# Patient Record
Sex: MALE | Race: WHITE | Employment: FULL TIME | URBAN - METROPOLITAN AREA
[De-identification: names, ages, dates, MRNs, and addresses within clinical notes are randomized per-mention and may not be internally consistent; named-entity substitution may affect disease eponyms.]

---

## 2018-03-14 ENCOUNTER — OFFICE VISIT (OUTPATIENT)
Dept: FAMILY MEDICINE CLINIC | Age: 43
End: 2018-03-14
Payer: COMMERCIAL

## 2018-03-14 ENCOUNTER — HOSPITAL ENCOUNTER (OUTPATIENT)
Dept: GENERAL RADIOLOGY | Age: 43
Discharge: HOME | End: 2018-03-16
Payer: COMMERCIAL

## 2018-03-14 VITALS
TEMPERATURE: 98.8 F | DIASTOLIC BLOOD PRESSURE: 72 MMHG | SYSTOLIC BLOOD PRESSURE: 118 MMHG | OXYGEN SATURATION: 98 % | HEIGHT: 67 IN | RESPIRATION RATE: 18 BRPM | BODY MASS INDEX: 31.08 KG/M2 | WEIGHT: 198 LBS | HEART RATE: 84 BPM

## 2018-03-14 DIAGNOSIS — R06.02 SOB (SHORTNESS OF BREATH): ICD-10-CM

## 2018-03-14 DIAGNOSIS — R05.9 COUGH: Primary | ICD-10-CM

## 2018-03-14 DIAGNOSIS — R05.9 COUGH: ICD-10-CM

## 2018-03-14 PROCEDURE — 71046 X-RAY EXAM CHEST 2 VIEWS: CPT

## 2018-03-14 PROCEDURE — 99213 OFFICE O/P EST LOW 20 MIN: CPT | Performed by: NURSE PRACTITIONER

## 2018-03-14 RX ORDER — GUAIFENESIN AND CODEINE PHOSPHATE 100; 10 MG/5ML; MG/5ML
5 SOLUTION ORAL EVERY 4 HOURS PRN
Qty: 118 ML | Refills: 0 | Status: SHIPPED | OUTPATIENT
Start: 2018-03-14 | End: 2018-03-21

## 2018-03-14 RX ORDER — AMOXICILLIN AND CLAVULANATE POTASSIUM 562.5; 437.5; 62.5 MG/1; MG/1; MG/1
1 TABLET, FILM COATED, EXTENDED RELEASE ORAL 2 TIMES DAILY
Qty: 14 TABLET | Refills: 0 | Status: SHIPPED | OUTPATIENT
Start: 2018-03-14 | End: 2018-03-21

## 2018-03-14 RX ORDER — BENZONATATE 100 MG/1
100 CAPSULE ORAL 3 TIMES DAILY PRN
Qty: 30 CAPSULE | Refills: 0 | Status: SHIPPED | OUTPATIENT
Start: 2018-03-14 | End: 2021-02-19

## 2018-03-14 ASSESSMENT — PATIENT HEALTH QUESTIONNAIRE - PHQ9
SUM OF ALL RESPONSES TO PHQ9 QUESTIONS 1 & 2: 0
2. FEELING DOWN, DEPRESSED OR HOPELESS: 0
SUM OF ALL RESPONSES TO PHQ QUESTIONS 1-9: 0
1. LITTLE INTEREST OR PLEASURE IN DOING THINGS: 0

## 2018-03-14 ASSESSMENT — ENCOUNTER SYMPTOMS
COUGH: 1
RHINORRHEA: 1
SHORTNESS OF BREATH: 1

## 2019-02-21 ENCOUNTER — TELEPHONE (OUTPATIENT)
Dept: FAMILY MEDICINE CLINIC | Age: 44
End: 2019-02-21

## 2021-06-14 ENCOUNTER — OFFICE VISIT (OUTPATIENT)
Dept: SPORTS MEDICINE | Age: 46
End: 2021-06-14
Payer: COMMERCIAL

## 2021-06-14 VITALS
TEMPERATURE: 98.7 F | DIASTOLIC BLOOD PRESSURE: 72 MMHG | HEIGHT: 67 IN | BODY MASS INDEX: 31.39 KG/M2 | WEIGHT: 200 LBS | SYSTOLIC BLOOD PRESSURE: 118 MMHG

## 2021-06-14 DIAGNOSIS — M19.022 PRIMARY OSTEOARTHRITIS OF LEFT ELBOW: Primary | ICD-10-CM

## 2021-06-14 PROCEDURE — 99213 OFFICE O/P EST LOW 20 MIN: CPT | Performed by: FAMILY MEDICINE

## 2021-06-14 ASSESSMENT — ENCOUNTER SYMPTOMS
EYE DISCHARGE: 0
SINUS PAIN: 0
CHEST TIGHTNESS: 0
APNEA: 0
ABDOMINAL DISTENTION: 0
FACIAL SWELLING: 0

## 2021-06-14 NOTE — PROGRESS NOTES
[facility]) Physicians  Neurosurgery and Pain [address] [facility]ish Boc[facility]: [phone]  F: [phone]      Taty Munoz  ([id number])    6/14/2021    Subjective: Taty Munoz is 55 y.o. male who complains today of:    Chief Complaint   Patient presents with    Follow-up     FOLLOW UP VISIT TO RECHECK LEFT ELBOW / 8 WEEKS POST PRP    Elbow Pain     PRIMARY OA OF THE LEFT ELBOW       HPI     Patient comes in stating that his elbow is feeling a lot better he says he gets a little tweak when he is doing push-ups other than that he is doing well  Allergies:  Erythromycin    Past Medical History:   Diagnosis Date    History of mononucleosis     History of pneumonia     History of prostatitis      No past surgical history on file. Family History   Problem Relation Age of Onset    Cancer Other         PROSTATE    Heart Attack Maternal Grandfather 64    Heart Attack Paternal Uncle 48     Social History     Socioeconomic History    Marital status:      Spouse name: Not on file    Number of children: Not on file    Years of education: Not on file    Highest education level: Not on file   Occupational History    Not on file   Tobacco Use    Smoking status: Former Smoker    Smokeless tobacco: Never Used   Substance and Sexual Activity    Alcohol use: Yes     Comment: occ    Drug use: No    Sexual activity: Yes     Partners: Female   Other Topics Concern    Not on file   Social History Narrative    Not on file     Social Determinants of Health     Financial Resource Strain:     Difficulty of Paying Living Expenses:    Food Insecurity:     Worried About Running Out of Food in the Last Year:     Ran Out of Food in the Last Year:    Transportation Needs:     Lack of Transportation (Medical):      Lack of Transportation (Non-Medical):    Physical Activity:     Days of Exercise per Week:     Minutes of Exercise per Session: point  Continue on his exercise program went over some exercise tips to take pressure off his elbow when he is doing his push-ups and also some other tips to help with weight training       No orders of the defined types were placed in this encounter. No orders of the defined types were placed in this encounter. Follow up:  Return if symptoms worsen or fail to improve.     MARYCARMEN PINTO, DO

## 2023-02-25 PROBLEM — F32.2 CURRENT SEVERE EPISODE OF MAJOR DEPRESSIVE DISORDER WITHOUT PSYCHOTIC FEATURES WITHOUT PRIOR EPISODE (MULTI): Status: ACTIVE | Noted: 2023-02-25

## 2023-02-25 PROBLEM — F34.1 PRIMARY DYSTHYMIA: Status: ACTIVE | Noted: 2023-02-25

## 2023-02-25 PROBLEM — F32.A DEPRESSION: Status: ACTIVE | Noted: 2023-02-25

## 2023-02-25 PROBLEM — M76.52 PATELLAR TENDINITIS OF BOTH KNEES: Status: ACTIVE | Noted: 2023-02-25

## 2023-02-25 PROBLEM — E66.811 CLASS 1 OBESITY WITH BODY MASS INDEX (BMI) OF 31.0 TO 31.9 IN ADULT: Status: ACTIVE | Noted: 2023-02-25

## 2023-02-25 PROBLEM — R73.9 HYPERGLYCEMIA: Status: ACTIVE | Noted: 2023-02-25

## 2023-02-25 PROBLEM — E78.49 OTHER HYPERLIPIDEMIA: Status: ACTIVE | Noted: 2023-02-25

## 2023-02-25 PROBLEM — R03.0 PREHYPERTENSION: Status: ACTIVE | Noted: 2023-02-25

## 2023-02-25 PROBLEM — E66.9 CLASS 1 OBESITY WITH BODY MASS INDEX (BMI) OF 31.0 TO 31.9 IN ADULT: Status: ACTIVE | Noted: 2023-02-25

## 2023-02-25 PROBLEM — M76.51 PATELLAR TENDINITIS OF BOTH KNEES: Status: ACTIVE | Noted: 2023-02-25

## 2023-02-25 PROBLEM — K21.9 ACID REFLUX: Status: ACTIVE | Noted: 2023-02-25

## 2023-02-25 RX ORDER — OMEPRAZOLE 20 MG/1
1 TABLET, DELAYED RELEASE ORAL DAILY
COMMUNITY
Start: 2022-09-21

## 2023-02-25 RX ORDER — CHOLECALCIFEROL (VITAMIN D3) 125 MCG
1 TABLET ORAL DAILY
COMMUNITY
Start: 2022-09-21

## 2023-02-25 RX ORDER — CALCIUM CARBONATE 300MG(750)
1 TABLET,CHEWABLE ORAL DAILY
COMMUNITY
Start: 2022-09-21

## 2023-02-25 RX ORDER — SERTRALINE HYDROCHLORIDE 50 MG/1
1 TABLET, FILM COATED ORAL DAILY
COMMUNITY
Start: 2020-05-04 | End: 2023-10-12 | Stop reason: SDUPTHER

## 2023-02-25 RX ORDER — GLUCOSAMINE/D3/BOSWELLIA SERRA 1500MG-400
1 TABLET ORAL DAILY
COMMUNITY
Start: 2022-09-21

## 2023-03-03 LAB
ALANINE AMINOTRANSFERASE (SGPT) (U/L) IN SER/PLAS: 38 U/L (ref 10–52)
ALBUMIN (G/DL) IN SER/PLAS: 4.4 G/DL (ref 3.4–5)
ALKALINE PHOSPHATASE (U/L) IN SER/PLAS: 77 U/L (ref 33–120)
ANION GAP IN SER/PLAS: 11 MMOL/L (ref 10–20)
APPEARANCE, URINE: CLEAR
ASPARTATE AMINOTRANSFERASE (SGOT) (U/L) IN SER/PLAS: 35 U/L (ref 9–39)
BASOPHILS (10*3/UL) IN BLOOD BY AUTOMATED COUNT: 0.04 X10E9/L (ref 0–0.1)
BASOPHILS/100 LEUKOCYTES IN BLOOD BY AUTOMATED COUNT: 0.5 % (ref 0–2)
BILIRUBIN TOTAL (MG/DL) IN SER/PLAS: 0.6 MG/DL (ref 0–1.2)
BILIRUBIN, URINE: NEGATIVE
BLOOD, URINE: NEGATIVE
CALCIUM (MG/DL) IN SER/PLAS: 9.7 MG/DL (ref 8.6–10.3)
CARBON DIOXIDE, TOTAL (MMOL/L) IN SER/PLAS: 29 MMOL/L (ref 21–32)
CHLORIDE (MMOL/L) IN SER/PLAS: 101 MMOL/L (ref 98–107)
CHOLESTEROL (MG/DL) IN SER/PLAS: 238 MG/DL (ref 0–199)
CHOLESTEROL IN HDL (MG/DL) IN SER/PLAS: 57.8 MG/DL
CHOLESTEROL/HDL RATIO: 4.1
COLOR, URINE: YELLOW
CREATININE (MG/DL) IN SER/PLAS: 0.97 MG/DL (ref 0.5–1.3)
EOSINOPHILS (10*3/UL) IN BLOOD BY AUTOMATED COUNT: 0.34 X10E9/L (ref 0–0.7)
EOSINOPHILS/100 LEUKOCYTES IN BLOOD BY AUTOMATED COUNT: 4.5 % (ref 0–6)
ERYTHROCYTE DISTRIBUTION WIDTH (RATIO) BY AUTOMATED COUNT: 11.6 % (ref 11.5–14.5)
ERYTHROCYTE MEAN CORPUSCULAR HEMOGLOBIN CONCENTRATION (G/DL) BY AUTOMATED: 33.8 G/DL (ref 32–36)
ERYTHROCYTE MEAN CORPUSCULAR VOLUME (FL) BY AUTOMATED COUNT: 95 FL (ref 80–100)
ERYTHROCYTES (10*6/UL) IN BLOOD BY AUTOMATED COUNT: 5.01 X10E12/L (ref 4.5–5.9)
ESTIMATED AVERAGE GLUCOSE FOR HBA1C: 103 MG/DL
GFR MALE: >90 ML/MIN/1.73M2
GLUCOSE (MG/DL) IN SER/PLAS: 104 MG/DL (ref 74–99)
GLUCOSE, URINE: NEGATIVE MG/DL
HEMATOCRIT (%) IN BLOOD BY AUTOMATED COUNT: 47.4 % (ref 41–52)
HEMOGLOBIN (G/DL) IN BLOOD: 16 G/DL (ref 13.5–17.5)
HEMOGLOBIN A1C/HEMOGLOBIN TOTAL IN BLOOD: 5.2 %
IMMATURE GRANULOCYTES/100 LEUKOCYTES IN BLOOD BY AUTOMATED COUNT: 0.4 % (ref 0–0.9)
KETONES, URINE: NEGATIVE MG/DL
LDL: 162 MG/DL (ref 0–99)
LEUKOCYTE ESTERASE, URINE: NEGATIVE
LEUKOCYTES (10*3/UL) IN BLOOD BY AUTOMATED COUNT: 7.6 X10E9/L (ref 4.4–11.3)
LYMPHOCYTES (10*3/UL) IN BLOOD BY AUTOMATED COUNT: 1.58 X10E9/L (ref 1.2–4.8)
LYMPHOCYTES/100 LEUKOCYTES IN BLOOD BY AUTOMATED COUNT: 20.9 % (ref 13–44)
MONOCYTES (10*3/UL) IN BLOOD BY AUTOMATED COUNT: 0.63 X10E9/L (ref 0.1–1)
MONOCYTES/100 LEUKOCYTES IN BLOOD BY AUTOMATED COUNT: 8.3 % (ref 2–10)
NEUTROPHILS (10*3/UL) IN BLOOD BY AUTOMATED COUNT: 4.94 X10E9/L (ref 1.2–7.7)
NEUTROPHILS/100 LEUKOCYTES IN BLOOD BY AUTOMATED COUNT: 65.4 % (ref 40–80)
NITRITE, URINE: NEGATIVE
PH, URINE: 7 (ref 5–8)
PLATELETS (10*3/UL) IN BLOOD AUTOMATED COUNT: 206 X10E9/L (ref 150–450)
POTASSIUM (MMOL/L) IN SER/PLAS: 4.2 MMOL/L (ref 3.5–5.3)
PROTEIN TOTAL: 6.9 G/DL (ref 6.4–8.2)
PROTEIN, URINE: NEGATIVE MG/DL
SODIUM (MMOL/L) IN SER/PLAS: 137 MMOL/L (ref 136–145)
SPECIFIC GRAVITY, URINE: 1.02 (ref 1–1.03)
THYROTROPIN (MIU/L) IN SER/PLAS BY DETECTION LIMIT <= 0.05 MIU/L: 1.64 MIU/L (ref 0.44–3.98)
TRIGLYCERIDE (MG/DL) IN SER/PLAS: 92 MG/DL (ref 0–149)
UREA NITROGEN (MG/DL) IN SER/PLAS: 25 MG/DL (ref 6–23)
UROBILINOGEN, URINE: <2 MG/DL (ref 0–1.9)
VLDL: 18 MG/DL (ref 0–40)

## 2023-03-10 ENCOUNTER — OFFICE VISIT (OUTPATIENT)
Dept: PRIMARY CARE | Facility: CLINIC | Age: 48
End: 2023-03-10
Payer: COMMERCIAL

## 2023-03-10 VITALS
HEART RATE: 85 BPM | WEIGHT: 205.2 LBS | SYSTOLIC BLOOD PRESSURE: 137 MMHG | OXYGEN SATURATION: 96 % | BODY MASS INDEX: 32.21 KG/M2 | TEMPERATURE: 97.8 F | DIASTOLIC BLOOD PRESSURE: 75 MMHG | RESPIRATION RATE: 16 BRPM | HEIGHT: 67 IN

## 2023-03-10 DIAGNOSIS — F34.1 PRIMARY DYSTHYMIA: ICD-10-CM

## 2023-03-10 DIAGNOSIS — E66.09 CLASS 1 OBESITY DUE TO EXCESS CALORIES WITHOUT SERIOUS COMORBIDITY WITH BODY MASS INDEX (BMI) OF 32.0 TO 32.9 IN ADULT: ICD-10-CM

## 2023-03-10 DIAGNOSIS — I10 BENIGN ESSENTIAL HTN: Primary | ICD-10-CM

## 2023-03-10 DIAGNOSIS — E78.2 MIXED HYPERLIPIDEMIA: ICD-10-CM

## 2023-03-10 DIAGNOSIS — F32.2 CURRENT SEVERE EPISODE OF MAJOR DEPRESSIVE DISORDER WITHOUT PSYCHOTIC FEATURES WITHOUT PRIOR EPISODE (MULTI): ICD-10-CM

## 2023-03-10 DIAGNOSIS — R73.9 HYPERGLYCEMIA: ICD-10-CM

## 2023-03-10 PROBLEM — F32.A DEPRESSION: Status: RESOLVED | Noted: 2023-02-25 | Resolved: 2023-03-10

## 2023-03-10 PROBLEM — R03.0 PREHYPERTENSION: Status: RESOLVED | Noted: 2023-02-25 | Resolved: 2023-03-10

## 2023-03-10 PROCEDURE — 99214 OFFICE O/P EST MOD 30 MIN: CPT | Performed by: FAMILY MEDICINE

## 2023-03-10 PROCEDURE — 3075F SYST BP GE 130 - 139MM HG: CPT | Performed by: FAMILY MEDICINE

## 2023-03-10 PROCEDURE — 3078F DIAST BP <80 MM HG: CPT | Performed by: FAMILY MEDICINE

## 2023-03-10 PROCEDURE — 3008F BODY MASS INDEX DOCD: CPT | Performed by: FAMILY MEDICINE

## 2023-03-10 RX ORDER — LISINOPRIL 10 MG/1
10 TABLET ORAL DAILY
Qty: 30 TABLET | Refills: 5 | Status: SHIPPED | OUTPATIENT
Start: 2023-03-10 | End: 2024-01-11

## 2023-03-10 ASSESSMENT — PATIENT HEALTH QUESTIONNAIRE - PHQ9
2. FEELING DOWN, DEPRESSED OR HOPELESS: NOT AT ALL
1. LITTLE INTEREST OR PLEASURE IN DOING THINGS: NOT AT ALL
SUM OF ALL RESPONSES TO PHQ9 QUESTIONS 1 & 2: 0

## 2023-03-10 NOTE — PATIENT INSTRUCTIONS
Patient to continue current medications (with any exceptions as noted) and diet.      Patient was started on:   Lisinopril 10 mg, TAKE 1 TAB BY MOUTH DAILY    Rx(s) sent to pharmacy.     Discussed medication side effects with patient.     Patient plans to eat less eggs or eat more egg whites on days when he lifts at the gym.

## 2023-03-10 NOTE — PROGRESS NOTES
"Subjective   Patient ID: Francisco Avila is a 47 y.o. male who presents for pre hypertension, depression, hyperglycemia, hyperlipidemia and other chronic conditions.     HPI   Patient has no medical concerns today and no refill requests.     He notes that he is currently eating 5 eggs per day, on days he lifts weights at the gym. He works out daily.     He is feeling well from a mood standpoint.     Review of Systems  Except positives as noted in the CC & HPI      Constitutional: Denies fevers, chills, night sweats, fatigue, weight changes, change in appetite    Eyes: Denies blurry vision, double vision    ENT: Denies otalgia, trouble hearing, tinnitus, vertigo, nasal congestion, rhinorrhea, sore throat    Neck: Denies swelling, masses    Cardiovascular: Denies chest pain, palpitations, edema, orthopnea, syncope    Respiratory: Denies dyspnea, cough, wheezing, postural nocturnal dyspnea    Gastrointestinal: Denies abdominal pain, nausea, vomiting, diarrhea, constipation, melena, hematochezia    Genitourinary: Denies dysuria, hematuria, frequency, urgency    Musculoskeletal: Denies back pain, neck pain, arthralgias, myalgias    Integumentary: Denies skin lesions, rashes, masses    Neurological: Denies dizziness, headaches, confusion, limb weakness, paresthesias, syncope, convulsions    Psychiatric: Denies depression, anxiety, homicidal ideations, suicidal ideations, sleep disturbances    Endocrine: Denies polyphagia, polydipsia, polyuria, weakness, hair thinning, heat intolerance, cold intolerance, weight changes    Heme/Lymph: Denies easy bruising, easy bleeding, swollen glands     Objective   /75   Pulse 85   Temp 36.6 °C (97.8 °F)   Resp 16   Ht 1.702 m (5' 7\")   Wt 93.1 kg (205 lb 3.2 oz)   SpO2 96%   BMI 32.14 kg/m²     Physical Exam  142/76 on recheck of BP in the right arm.     General Appearance - well-developed, well-nourished, 47 y.o., White male in no acute distress.     Skin - warm, pink and dry " without rash or concerning lesions.     Mental Status - alert and oriented x 3. Normal mood and affect appropriate to mood.     Neck - supple without lymphadenopathy. Carotid pulses are normal without bruits. Thyroid is normal in midline without nodules.     Chest - lungs are clear to auscultation without rales, rhonchi or wheezes.     Heart - regular, rate and rhythm without murmurs, rubs or gallops.    Abdomen - soft, obese, protuberant, nontender, nondistended. No masses, hepatomegaly or splenomegaly is noted. No rebound, rigidity or guarding is noted. Bowel sounds are normoactive.    Extremities - no cyanosis, clubbing or edema. Pedal pulses are 2+ normal at the dorsalis pedis and posterior pulses bilaterally.     Neurological - cranial nerves II through XII are grossly intact. Motor strength 5/5 at all fours.     Lab Results   Component Value Date    HGBA1C 5.2 03/03/2023      Lab Results   Component Value Date    TSH 1.64 03/03/2023        Chemistry    Lab Results   Component Value Date/Time     03/03/2023 0913    K 4.2 03/03/2023 0913     03/03/2023 0913    CO2 29 03/03/2023 0913    BUN 25 (H) 03/03/2023 0913    CREATININE 0.97 03/03/2023 0913    Lab Results   Component Value Date/Time    CALCIUM 9.7 03/03/2023 0913    ALKPHOS 77 03/03/2023 0913    AST 35 03/03/2023 0913    ALT 38 03/03/2023 0913    BILITOT 0.6 03/03/2023 0913         Lab Results   Component Value Date    CHOL 238 (H) 03/03/2023    CHOL 212 (H) 05/12/2021     Lab Results   Component Value Date    HDL 57.8 03/03/2023    HDL 51.0 05/12/2021     Lab Results   Component Value Date    TRIG 92 03/03/2023    TRIG 155 (H) 05/12/2021     Assessment/Plan   Patient to continue current medications (with any exceptions as noted) and diet. Follow-up in 1-2 month(s) otherwise as needed.     Patient was started on:   Lisinopril 10 mg, TAKE 1 TAB BY MOUTH DAILY    Rx(s) sent to pharmacy.     Discussed medication side effects with patient.      Patient plans to eat less eggs or eat more egg whites on days when he lifts at the gym.     Scribe Attestation  By signing my name below, I, Heber Hong   attest that this documentation has been prepared under the direction and in the presence of Tigre Dunlap MD.

## 2023-05-11 NOTE — PROGRESS NOTES
No chief complaint on file.      HPI: Francisco Avila is a 48 y.o. male presenting for follow-up of Acid Reflux, HLD, and other chronic conditions .  I last saw the patient on 3/10/23.         ROS    Except positives as noted in the CC & HPI   Constitutional: Denies fevers, chills, night sweats, fatigue, weight changes, change in appetite   Eyes: Denies blurry vision, double vision   ENT: Denies otalgia, trouble hearing, tinnitus, vertigo, nasal congestion, rhinorrhea, sore throat   Neck: Denies swelling, masses   Cardiovascular: Denies chest pain, palpitations, edema, orthopnea, syncope   Respiratory: Denies dyspnea, cough, wheezing, postural nocturnal dyspnea   Gastrointestinal: Denies abdominal pain, nausea, vomiting, diarrhea, constipation, melena, hematochezia   Genitourinary: Denies dysuria, hematuria, frequency, urgency   Musculoskeletal: Denies back pain, neck pain, arthralgias, myalgias   Integumentary: Denies skin lesions, rashes, masses   Neurological: Denies dizziness, headaches, confusion, limb weakness, paresthesias, syncope, convulsions   Psychiatric: Denies depression, anxiety, homicidal ideations, suicidal ideations, sleep disturbances   Endocrine: Denies polyphagia, polydipsia, polyuria, weakness, hair thinning, heat intolerance, cold intolerance, weight changes   Heme/Lymph: Denies easy bruising, easy bleeding, swollen glands     There were no vitals filed for this visit.    PHYSICAL EXAM    GENERAL APPEARANCE: well-developed, well-nourished, 48 y.o. male in no acute distress.  SKIN: warm, pink and dry without rash or concerning lesions.  MENTAL STATUS: alert and oriented × 3. Normal mood and affect appropriate to mood.  EARS: TMs shiny and move well with insufflation. Ear canals are clear bilaterally.  NECK: supple without lymphadenopathy. Carotid pulses are normal without bruits. Thyroid - is normal in midline without nodules.  CHEST: lungs are clear to auscultation without rales, rhonchi or  wheezes.  HEART: regular, rate and rhythm without murmurs, rubs or gallops.  ABDOMEN: soft, flat, nondistended. No masses, hepatomegaly or splenomegaly is noted.  EXTREMITIES: no cyanosis, clubbing or edema. Pedal pulses are 2+ normal at the dorsalis pedis and posterior tibial pulses bilaterally.  NEUROLOGICAL: cranial nerves II through XII are grossly intact. Motor strength 5/5 at all fours. DTRs are 2+ normal at all fours bilaterally and symmetrically.      Below is the patient's most recent value for Albumin, ALT, AST, BUN, Calcium, Chloride, Cholesterol, CO2, Creatinine, GFR, Glucose, HDL, Hematocrit, Hemoglobin, Hemoglobin A1C, LDL, Magnesium, Phosphorus, Platelets, Potassium, PSA, Sodium, Triglycerides, and WBC.   Lab Results   Component Value Date    ALBUMIN 4.4 03/03/2023    ALT 38 03/03/2023    AST 35 03/03/2023    BUN 25 (H) 03/03/2023    CALCIUM 9.7 03/03/2023     03/03/2023    CHOL 238 (H) 03/03/2023    CO2 29 03/03/2023    CREATININE 0.97 03/03/2023    HDL 57.8 03/03/2023    HCT 47.4 03/03/2023    HGB 16.0 03/03/2023    HGBA1C 5.2 03/03/2023     03/03/2023    K 4.2 03/03/2023     03/03/2023    TRIG 92 03/03/2023    WBC 7.6 03/03/2023         ASSESSMENT:  No diagnosis found.    PLAN:  No orders of the defined types were placed in this encounter.      [unfilled]    I have instructed the patient to follow-up with me in *** months or sooner if needed.

## 2023-05-12 ENCOUNTER — APPOINTMENT (OUTPATIENT)
Dept: PRIMARY CARE | Facility: CLINIC | Age: 48
End: 2023-05-12
Payer: COMMERCIAL

## 2023-05-19 NOTE — PROGRESS NOTES
"Subjective   Patient ID: Francisco Avila is a 48 y.o. male who presents for GERD, Hyperlipidemia, and Follow-up. I last saw the patient on 3/10/2023.     HPI   Patient has no medical complaints today.    Review of Systems  Except positives as noted in the CC & HPI      Constitutional: Denies fevers, chills, night sweats, fatigue, weight changes, change in appetite    Eyes: Denies blurry vision, double vision    ENT: Denies otalgia, trouble hearing, tinnitus, vertigo, nasal congestion, rhinorrhea, sore throat    Neck: Denies swelling, masses    Cardiovascular: Denies chest pain, palpitations, edema, orthopnea, syncope    Respiratory: Denies dyspnea, cough, wheezing, postural nocturnal dyspnea    Gastrointestinal: Denies abdominal pain, nausea, vomiting, diarrhea, constipation, melena, hematochezia    Genitourinary: Denies dysuria, hematuria, frequency, urgency    Musculoskeletal: Denies back pain, neck pain, arthralgias, myalgias    Integumentary: Denies skin lesions, rashes, masses    Neurological: Denies dizziness, headaches, confusion, limb weakness, paresthesias, syncope, convulsions    Psychiatric: Denies depression, anxiety, homicidal ideations, suicidal ideations, sleep disturbances    Endocrine: Denies polyphagia, polydipsia, polyuria, weakness, hair thinning, heat intolerance, cold intolerance, weight changes    Heme/Lymph: Denies easy bruising, easy bleeding, swollen glands     Objective   /66 (BP Location: Left arm, Patient Position: Sitting)   Pulse 74   Temp 36.3 °C (97.3 °F) (Temporal)   Resp 16   Ht 1.702 m (5' 7\")   Wt 92.6 kg (204 lb 3.2 oz)   SpO2 94%   BMI 31.98 kg/m²     Physical Exam  132/66 on recheck of BP in the right arm. 100/66 on recheck of BP in the left arm.     General Appearance - well-developed, well-nourished, 48 y.o., White male in no acute distress.     Skin - warm, pink and dry without rash or concerning lesions.     Mental Status - alert and oriented x 3. Normal mood and " affect appropriate to mood.     Neck - supple without lymphadenopathy. Carotid pulses are normal without bruits. Thyroid is normal in midline without nodules.     Chest - lungs are clear to auscultation without rales, rhonchi or wheezes.     Heart - regular, rate and rhythm without murmurs, rubs or gallops.    Extremities - no cyanosis, clubbing or edema. Pedal pulses are 2+ normal at the dorsalis pedis and posterior pulses bilaterally.     Neurological - cranial nerves II through XII are grossly intact. Motor strength 5/5 at all fours.     Assessment/Plan   1. Benign essential HTN  Vascular US upper extremity PVR for TOS      2. Unequal blood pressure in upper extremities  Vascular US upper extremity PVR for TOS      3. Gastroesophageal reflux disease without esophagitis        4. Mixed hyperlipidemia        5. Current severe episode of major depressive disorder without psychotic features without prior episode (CMS/HCC)        6. Class 1 obesity due to excess calories without serious comorbidity with body mass index (BMI) of 31.0 to 31.9 in adult        Patient to continue current medications (with any exceptions as noted) and diet. Follow-up in 6 month(s) otherwise as needed.      Ordered bilateral UE PVR study. Will call patient with results when available.     Consider referral to cardiology pending test results.         Scribe Attestation  By signing my name below, IRaine Scribe   attest that this documentation has been prepared under the direction and in the presence of Tigre Dunlap MD.

## 2023-05-22 ENCOUNTER — OFFICE VISIT (OUTPATIENT)
Dept: PRIMARY CARE | Facility: CLINIC | Age: 48
End: 2023-05-22
Payer: COMMERCIAL

## 2023-05-22 VITALS
RESPIRATION RATE: 16 BRPM | HEIGHT: 67 IN | TEMPERATURE: 97.3 F | OXYGEN SATURATION: 94 % | HEART RATE: 74 BPM | WEIGHT: 204.2 LBS | BODY MASS INDEX: 32.05 KG/M2 | DIASTOLIC BLOOD PRESSURE: 66 MMHG | SYSTOLIC BLOOD PRESSURE: 100 MMHG

## 2023-05-22 DIAGNOSIS — R09.89 UNEQUAL BLOOD PRESSURE IN UPPER EXTREMITIES: ICD-10-CM

## 2023-05-22 DIAGNOSIS — I10 BENIGN ESSENTIAL HTN: Primary | ICD-10-CM

## 2023-05-22 DIAGNOSIS — E66.09 CLASS 1 OBESITY DUE TO EXCESS CALORIES WITHOUT SERIOUS COMORBIDITY WITH BODY MASS INDEX (BMI) OF 31.0 TO 31.9 IN ADULT: ICD-10-CM

## 2023-05-22 DIAGNOSIS — E78.2 MIXED HYPERLIPIDEMIA: ICD-10-CM

## 2023-05-22 DIAGNOSIS — F32.2 CURRENT SEVERE EPISODE OF MAJOR DEPRESSIVE DISORDER WITHOUT PSYCHOTIC FEATURES WITHOUT PRIOR EPISODE (MULTI): ICD-10-CM

## 2023-05-22 DIAGNOSIS — K21.9 GASTROESOPHAGEAL REFLUX DISEASE WITHOUT ESOPHAGITIS: ICD-10-CM

## 2023-05-22 PROBLEM — M19.022 PRIMARY OSTEOARTHRITIS OF LEFT ELBOW: Status: ACTIVE | Noted: 2021-06-14

## 2023-05-22 PROCEDURE — 1036F TOBACCO NON-USER: CPT | Performed by: FAMILY MEDICINE

## 2023-05-22 PROCEDURE — 3008F BODY MASS INDEX DOCD: CPT | Performed by: FAMILY MEDICINE

## 2023-05-22 PROCEDURE — 3078F DIAST BP <80 MM HG: CPT | Performed by: FAMILY MEDICINE

## 2023-05-22 PROCEDURE — 3074F SYST BP LT 130 MM HG: CPT | Performed by: FAMILY MEDICINE

## 2023-05-22 PROCEDURE — 99213 OFFICE O/P EST LOW 20 MIN: CPT | Performed by: FAMILY MEDICINE

## 2023-05-22 ASSESSMENT — LIFESTYLE VARIABLES
HOW MANY STANDARD DRINKS CONTAINING ALCOHOL DO YOU HAVE ON A TYPICAL DAY: 1 OR 2
SKIP TO QUESTIONS 9-10: 1
HOW OFTEN DO YOU HAVE A DRINK CONTAINING ALCOHOL: 2-4 TIMES A MONTH
HOW OFTEN DO YOU HAVE SIX OR MORE DRINKS ON ONE OCCASION: NEVER
AUDIT-C TOTAL SCORE: 2

## 2023-05-22 ASSESSMENT — PAIN SCALES - GENERAL: PAINLEVEL: 0-NO PAIN

## 2023-05-22 NOTE — PATIENT INSTRUCTIONS
Patient to continue current medications (with any exceptions as noted) and diet. Follow-up in 6 month(s) otherwise as needed.      Ordered bilateral UE PVR study. Will call patient with results when available.     Consider referral to cardiology pending test results.

## 2023-10-12 DIAGNOSIS — F32.2 CURRENT SEVERE EPISODE OF MAJOR DEPRESSIVE DISORDER WITHOUT PSYCHOTIC FEATURES WITHOUT PRIOR EPISODE (MULTI): Primary | ICD-10-CM

## 2023-10-12 RX ORDER — SERTRALINE HYDROCHLORIDE 50 MG/1
50 TABLET, FILM COATED ORAL DAILY
Qty: 90 TABLET | Refills: 3 | Status: SHIPPED | OUTPATIENT
Start: 2023-10-12

## 2023-10-12 NOTE — TELEPHONE ENCOUNTER
Rx Refill Request     Name: Francisco RAIN Austin  :  1975   Medication Name:  sertraline   Specific Pharmacy location:  [facility]  Date of last appointment:  2023  Date of next appointment:  2023  Best number to reach patient:  [phone]

## 2023-11-22 ENCOUNTER — APPOINTMENT (OUTPATIENT)
Dept: PRIMARY CARE | Facility: CLINIC | Age: 48
End: 2023-11-22
Payer: COMMERCIAL

## 2023-12-12 ENCOUNTER — APPOINTMENT (OUTPATIENT)
Dept: PRIMARY CARE | Facility: CLINIC | Age: 48
End: 2023-12-12
Payer: COMMERCIAL

## 2024-01-11 DIAGNOSIS — I10 BENIGN ESSENTIAL HTN: Primary | ICD-10-CM

## 2024-01-11 RX ORDER — LISINOPRIL 10 MG/1
10 TABLET ORAL DAILY
Qty: 30 TABLET | Refills: 5 | Status: SHIPPED | OUTPATIENT
Start: 2024-01-11 | End: 2024-07-09

## 2024-01-11 NOTE — TELEPHONE ENCOUNTER
Rx Refill Request     Name: Francisco Avila  :  1975   Medication Name:    Lisinopril 10 mg tablet    Last fill: 2023 30 day supply Q 30  [facility]  Date of last appointment:  2023   Date of next appointment:  N/A   Best number to reach patient:  [phone]       RX PENDED to [facility] as directed by Electronic Correspondence

## 2024-10-26 DIAGNOSIS — I10 BENIGN ESSENTIAL HTN: ICD-10-CM

## 2024-10-26 DIAGNOSIS — F32.2 CURRENT SEVERE EPISODE OF MAJOR DEPRESSIVE DISORDER WITHOUT PSYCHOTIC FEATURES WITHOUT PRIOR EPISODE (MULTI): ICD-10-CM

## 2024-10-31 RX ORDER — LISINOPRIL 10 MG/1
10 TABLET ORAL DAILY
Qty: 30 TABLET | Refills: 0 | Status: SHIPPED | OUTPATIENT
Start: 2024-10-31 | End: 2025-04-29

## 2024-10-31 RX ORDER — SERTRALINE HYDROCHLORIDE 50 MG/1
50 TABLET, FILM COATED ORAL DAILY
Qty: 30 TABLET | Refills: 0 | Status: SHIPPED | OUTPATIENT
Start: 2024-10-31

## 2024-11-27 ENCOUNTER — APPOINTMENT (OUTPATIENT)
Dept: PRIMARY CARE | Facility: CLINIC | Age: 49
End: 2024-11-27
Payer: COMMERCIAL

## 2024-11-27 VITALS
BODY MASS INDEX: 30.92 KG/M2 | DIASTOLIC BLOOD PRESSURE: 58 MMHG | HEIGHT: 67 IN | HEART RATE: 87 BPM | TEMPERATURE: 97.2 F | WEIGHT: 197 LBS | RESPIRATION RATE: 16 BRPM | OXYGEN SATURATION: 97 % | SYSTOLIC BLOOD PRESSURE: 102 MMHG

## 2024-11-27 DIAGNOSIS — I10 BENIGN ESSENTIAL HTN: ICD-10-CM

## 2024-11-27 DIAGNOSIS — F32.2 CURRENT SEVERE EPISODE OF MAJOR DEPRESSIVE DISORDER WITHOUT PSYCHOTIC FEATURES WITHOUT PRIOR EPISODE (MULTI): ICD-10-CM

## 2024-11-27 DIAGNOSIS — Z12.11 COLON CANCER SCREENING: ICD-10-CM

## 2024-11-27 DIAGNOSIS — E78.2 MIXED HYPERLIPIDEMIA: ICD-10-CM

## 2024-11-27 DIAGNOSIS — F10.11 ALCOHOL ABUSE, IN REMISSION: ICD-10-CM

## 2024-11-27 DIAGNOSIS — Z00.00 ROUTINE GENERAL MEDICAL EXAMINATION AT A HEALTH CARE FACILITY: Primary | ICD-10-CM

## 2024-11-27 DIAGNOSIS — E66.09 CLASS 1 OBESITY DUE TO EXCESS CALORIES WITHOUT SERIOUS COMORBIDITY WITH BODY MASS INDEX (BMI) OF 30.0 TO 30.9 IN ADULT: ICD-10-CM

## 2024-11-27 DIAGNOSIS — F32.5 MAJOR DEPRESSIVE DISORDER WITH SINGLE EPISODE, IN FULL REMISSION (CMS-HCC): ICD-10-CM

## 2024-11-27 DIAGNOSIS — Z23 NEED FOR TDAP VACCINATION: ICD-10-CM

## 2024-11-27 DIAGNOSIS — E66.811 CLASS 1 OBESITY DUE TO EXCESS CALORIES WITHOUT SERIOUS COMORBIDITY WITH BODY MASS INDEX (BMI) OF 30.0 TO 30.9 IN ADULT: ICD-10-CM

## 2024-11-27 PROCEDURE — 90471 IMMUNIZATION ADMIN: CPT | Performed by: FAMILY MEDICINE

## 2024-11-27 PROCEDURE — 90715 TDAP VACCINE 7 YRS/> IM: CPT | Performed by: FAMILY MEDICINE

## 2024-11-27 PROCEDURE — 99396 PREV VISIT EST AGE 40-64: CPT | Performed by: FAMILY MEDICINE

## 2024-11-27 PROCEDURE — 3074F SYST BP LT 130 MM HG: CPT | Performed by: FAMILY MEDICINE

## 2024-11-27 PROCEDURE — 3008F BODY MASS INDEX DOCD: CPT | Performed by: FAMILY MEDICINE

## 2024-11-27 PROCEDURE — 3078F DIAST BP <80 MM HG: CPT | Performed by: FAMILY MEDICINE

## 2024-11-27 PROCEDURE — 1036F TOBACCO NON-USER: CPT | Performed by: FAMILY MEDICINE

## 2024-11-27 RX ORDER — SERTRALINE HYDROCHLORIDE 50 MG/1
50 TABLET, FILM COATED ORAL DAILY
Qty: 90 TABLET | Refills: 3 | Status: SHIPPED | OUTPATIENT
Start: 2024-11-27 | End: 2025-11-27

## 2024-11-27 RX ORDER — LISINOPRIL 10 MG/1
10 TABLET ORAL DAILY
Qty: 90 TABLET | Refills: 3 | Status: SHIPPED | OUTPATIENT
Start: 2024-11-27 | End: 2025-11-27

## 2024-11-27 ASSESSMENT — PATIENT HEALTH QUESTIONNAIRE - PHQ9
SUM OF ALL RESPONSES TO PHQ9 QUESTIONS 1 AND 2: 0
1. LITTLE INTEREST OR PLEASURE IN DOING THINGS: NOT AT ALL
2. FEELING DOWN, DEPRESSED OR HOPELESS: NOT AT ALL

## 2024-11-27 NOTE — PROGRESS NOTES
"Subjective   Patient ID: Francisco Avila is a 49 y.o. male who presents for Annual Exam. I last saw the patient on 5/22/2023    HPI     Patient is here for a ANNUAL  Pt said no to flu vaccine    Past medical, surgical, and family history reviewed.  Reviewed and documented all medications   Pt eating well, exercising as tolerated and taking medications as directed    Patient mentions quitting alcohol around 10 months ago and doing well after that. He says his blood pressures are well maintained after quitting alcohol.      Review of Systems  Except positives as noted in the CC & HPI      Constitutional: Denies fevers, chills, night sweats, fatigue, weight changes, change in appetite    Eyes: Denies blurry vision, double vision    ENT: Denies otalgia, trouble hearing, tinnitus, vertigo, nasal congestion, rhinorrhea, sore throat    Neck: Denies swelling, masses    Cardiovascular: Denies chest pain, palpitations, edema, orthopnea, syncope    Respiratory: Denies dyspnea, cough, wheezing, postural nocturnal dyspnea    Gastrointestinal: Denies abdominal pain, nausea, vomiting, diarrhea, constipation, melena, hematochezia    Genitourinary: Denies dysuria, hematuria, frequency, urgency    Musculoskeletal: Denies back pain, neck pain, arthralgias, myalgias    Integumentary: Denies skin lesions, rashes, masses    Neurological: Denies dizziness, headaches, confusion, limb weakness, paresthesias, syncope, convulsions    Psychiatric: Denies depression, anxiety, homicidal ideations, suicidal ideations, sleep disturbances    Endocrine: Denies polyphagia, polydipsia, polyuria, weakness, hair thinning, heat intolerance, cold intolerance, weight changes    Heme/Lymph: Denies easy bruising, easy bleeding, swollen glands     Objective   /58 (BP Location: Right arm, Patient Position: Sitting, BP Cuff Size: Large adult)   Pulse 87   Temp 36.2 °C (97.2 °F)   Resp 16   Ht 1.702 m (5' 7\")   Wt 89.4 kg (197 lb)   SpO2 97%   BMI " 30.85 kg/m²     Physical Exam  102/58 on recheck of BP in the right arm. 100/66 on recheck of BP in the left arm.     General Appearance - well-developed, well-nourished, 49 y.o., White male in no acute distress.     Skin - warm, pink and dry without rash or concerning lesions.     Mental Status - alert and oriented x 3. Normal mood and affect appropriate to mood.     Ears - TMs shiny and move well with insufflation. Mild cerumen impaction bilaterally.    Mouth - pharynx is pink without exudates. Dentition is normal appearing. Tongue and uvula move in the midline.     Neck - supple without lymphadenopathy. Carotid pulses are normal without bruits. Thyroid is normal in midline without nodules.     Chest - lungs are clear to auscultation without rales, rhonchi or wheezes.     Heart - regular, rate and rhythm without murmurs, rubs or gallops.    Abdomen - soft, obese, protuberant, nontender, nondistended. No masses, hepatomegaly or splenomegaly is noted. No rebound, rigidity or guarding is noted. Bowel sounds are normoactive.     Extremities - no cyanosis, clubbing or edema. Pedal pulses are 2+ normal at the dorsalis pedis and posterior pulses bilaterally.     Neurological - cranial nerves II through XII are grossly intact. Motor strength 5/5 at all fours.     Assessment/Plan   1. Routine general medical examination at a health care facility        2. Benign essential HTN  lisinopril 10 mg tablet    Comprehensive metabolic panel    CBC and Auto Differential    TSH with reflex to Free T4 if abnormal      3. Major depressive disorder with single episode, in full remission (CMS-HCC)  sertraline (Zoloft) 50 mg tablet      4. Mixed hyperlipidemia  Lipid panel      5. Alcohol abuse, in remission        6. Colon cancer screening  Cologuard® colon cancer screening    Cologuard® colon cancer screening      7. Need for Tdap vaccination  Tdap vaccine, age 7 years and older      8. Class 1 obesity due to excess calories without  serious comorbidity with body mass index (BMI) of 30.0 to 30.9 in adult        9. Current severe episode of major depressive disorder without psychotic features without prior episode (Multi)          Patient to continue current medications (with any exceptions as noted) and diet. Follow-up in 12 month(s) otherwise as needed.      Patient is to return for fasting CBC and Auto Differential, Comprehensive Metabolic Panel, Lipid profile, TSH with reflex free T4 if abnormal labs at their convenience prior to their next appointment. Fasting is nothing to eat or drink except water or black coffee for 8-12 hours. Will call patient with results when available.     Patient was ordered a Cologuard test for colorectal cancer screening. Patient will be contacted by [employer] to verify home address, insurance, etc. Once the kit is received and completed, patient is to ship the kit back via [employer] on a Monday or Tuesday to ensure it is received by [employer] for processing in the appropriate time frame. Patient may drop off the completed kit at any UPS store or schedule a pick-up at their home, whichever is more convenient. Failure to ship on a Monday or Tuesday could cause their sample to become invalid for testing due to the delay.     Patient was given refill(s) on:   Sertraline HCl 50 mg, TAKE 1 TAB BY MOUTH DAILY  Lisinopril 10 mg, TAKE 1 TAB BY MOUTH DAILY     Rx(s) sent to pharmacy.      Encouraged patient to drink plenty of fluids, at least 64 oz of water daily.      Tdap injection given in the office today.     Patient declined FLU vaccine at this time.     Scribe Attestation  By signing my name below, IKatie Scribe   attest that this documentation has been prepared under the direction and in the presence of Tigre Dunlap MD.

## 2024-11-28 NOTE — ASSESSMENT & PLAN NOTE
Current severe episode of major depressive disorder in remission is stable.  Patient to continue with current medications and treatment plan.  Follow-up at least annually.

## 2025-02-14 LAB — NONINV COLON CA DNA+OCC BLD SCRN STL QL: NEGATIVE

## 2025-06-05 ENCOUNTER — OFFICE VISIT (OUTPATIENT)
Dept: URGENT CARE | Age: 50
End: 2025-06-05
Payer: COMMERCIAL

## 2025-06-05 VITALS
DIASTOLIC BLOOD PRESSURE: 83 MMHG | RESPIRATION RATE: 18 BRPM | OXYGEN SATURATION: 98 % | SYSTOLIC BLOOD PRESSURE: 155 MMHG | HEIGHT: 67 IN | BODY MASS INDEX: 30.13 KG/M2 | HEART RATE: 79 BPM | TEMPERATURE: 97.9 F | WEIGHT: 192 LBS

## 2025-06-05 DIAGNOSIS — R19.00 ABDOMINAL WALL BULGE: Primary | ICD-10-CM

## 2025-06-05 ASSESSMENT — PAIN SCALES - GENERAL: PAINLEVEL_OUTOF10: 6

## 2025-06-05 NOTE — PATIENT INSTRUCTIONS
Call to schedule follow up with general surgeon, follow care instructions. Go to ER if pain worsens.

## 2025-06-05 NOTE — PROGRESS NOTES
"Subjective   Patient ID: Francisco Avila is a 50 y.o. male. They present today with a chief complaint of Physical (Started yesterday/Possible hernia. I have a protrusion in my right groin area and pain. ).    History of Present Illness  This is a 50-year-old male who presents to the urgent care with complaints of an abdominal bulge.  He states it is near his right groin.  Patient denies any other systemic complaints at this time.  Patient states he noticed it yesterday and the pain improved.  Patient denies any other systemic complaints at this time.  Patient denies any known history of any hernias.          Past Medical History  Allergies as of 06/05/2025 - Reviewed 06/05/2025   Allergen Reaction Noted    Erythromycin Unknown 02/25/2023       Prescriptions Prior to Admission[1]     Medical History[2]    Surgical History[3]     reports that he has never smoked. He has never been exposed to tobacco smoke. He has never used smokeless tobacco. He reports that he does not currently use alcohol. He reports that he does not use drugs.    Review of Systems  Review of Systems   All other systems reviewed and are negative.                                 Objective    Vitals:    06/05/25 1414   BP: 155/83   BP Location: Right arm   Patient Position: Sitting   BP Cuff Size: Adult   Pulse: 79   Resp: 18   Temp: 36.6 °C (97.9 °F)   TempSrc: Oral   SpO2: 98%   Weight: 87.1 kg (192 lb)   Height: 1.702 m (5' 7\")     No LMP for male patient.    Physical Exam  Vitals and nursing note reviewed.   Constitutional:       Appearance: Normal appearance.      Comments: Chaperone present in the room   HENT:      Head: Normocephalic and atraumatic.      Mouth/Throat:      Mouth: Mucous membranes are moist.   Eyes:      Extraocular Movements: Extraocular movements intact.      Conjunctiva/sclera: Conjunctivae normal.   Pulmonary:      Effort: No respiratory distress.   Abdominal:      General: Abdomen is flat.      Palpations: Abdomen is soft.    "   Comments: + Right groin with potential bulge for concern for inguinal hernia.   Skin:     General: Skin is warm and dry.   Neurological:      General: No focal deficit present.      Mental Status: He is alert and oriented to person, place, and time.         Procedures    Point of Care Test & Imaging Results from this visit  No results found for this visit on 06/05/25.   Imaging  No results found.    Cardiology, Vascular, and Other Imaging  No other imaging results found for the past 2 days      Diagnostic study results (if any) were reviewed by Dulce Gonsales PA-C.    Assessment/Plan   Allergies, medications, history, and pertinent labs/EKGs/Imaging reviewed by Dulce Gonsales PA-C.     Medical Decision Making  Abdominal bulge -> concern for possible inguinal hernia.  Patient is not in significant pain.  Patient is stable nontoxic-appearing no acute distress.  Care instructions were discussed and advised to follow-up with general surgery for possible hernia evaluation.  Patient is agreeable.  Care instructions were also provided. Care instructions/red flags, return precautions & ADEs discussed when to seek medical attention in clinic vs ER and pt agreed/understood. Follow up with primary care provider in 3-5 days if no improvement.      Orders and Diagnoses  Diagnoses and all orders for this visit:  Abdominal wall bulge  -     Referral to General Surgery; Future      Medical Admin Record      Patient disposition: Home    Electronically signed by Dulce Gonsales PA-C  2:30 PM           [1] (Not in a hospital admission)   [2] History reviewed. No pertinent past medical history.  [3] History reviewed. No pertinent surgical history.

## 2025-06-06 LAB
ALBUMIN SERPL-MCNC: 4.5 G/DL (ref 3.6–5.1)
ALP SERPL-CCNC: 76 U/L (ref 35–144)
ALT SERPL-CCNC: 40 U/L (ref 9–46)
ANION GAP SERPL CALCULATED.4IONS-SCNC: 8 MMOL/L (CALC) (ref 7–17)
AST SERPL-CCNC: 29 U/L (ref 10–35)
BASOPHILS # BLD AUTO: 67 CELLS/UL (ref 0–200)
BASOPHILS NFR BLD AUTO: 1.1 %
BILIRUB SERPL-MCNC: 0.7 MG/DL (ref 0.2–1.2)
BUN SERPL-MCNC: 26 MG/DL (ref 7–25)
CALCIUM SERPL-MCNC: 9.3 MG/DL (ref 8.6–10.3)
CHLORIDE SERPL-SCNC: 101 MMOL/L (ref 98–110)
CHOLEST SERPL-MCNC: 261 MG/DL
CHOLEST/HDLC SERPL: 4.3 (CALC)
CO2 SERPL-SCNC: 28 MMOL/L (ref 20–32)
CREAT SERPL-MCNC: 1 MG/DL (ref 0.7–1.3)
EGFRCR SERPLBLD CKD-EPI 2021: 92 ML/MIN/1.73M2
EOSINOPHIL # BLD AUTO: 390 CELLS/UL (ref 15–500)
EOSINOPHIL NFR BLD AUTO: 6.4 %
ERYTHROCYTE [DISTWIDTH] IN BLOOD BY AUTOMATED COUNT: 12.1 % (ref 11–15)
GLUCOSE SERPL-MCNC: 100 MG/DL (ref 65–99)
HCT VFR BLD AUTO: 49 % (ref 38.5–50)
HDLC SERPL-MCNC: 61 MG/DL
HGB BLD-MCNC: 16.1 G/DL (ref 13.2–17.1)
LDLC SERPL CALC-MCNC: 180 MG/DL (CALC)
LYMPHOCYTES # BLD AUTO: 1531 CELLS/UL (ref 850–3900)
LYMPHOCYTES NFR BLD AUTO: 25.1 %
MCH RBC QN AUTO: 31.7 PG (ref 27–33)
MCHC RBC AUTO-ENTMCNC: 32.9 G/DL (ref 32–36)
MCV RBC AUTO: 96.5 FL (ref 80–100)
MONOCYTES # BLD AUTO: 598 CELLS/UL (ref 200–950)
MONOCYTES NFR BLD AUTO: 9.8 %
NEUTROPHILS # BLD AUTO: 3514 CELLS/UL (ref 1500–7800)
NEUTROPHILS NFR BLD AUTO: 57.6 %
NONHDLC SERPL-MCNC: 200 MG/DL (CALC)
PLATELET # BLD AUTO: 225 THOUSAND/UL (ref 140–400)
PMV BLD REES-ECKER: 10.3 FL (ref 7.5–12.5)
POTASSIUM SERPL-SCNC: 4.6 MMOL/L (ref 3.5–5.3)
PROT SERPL-MCNC: 6.8 G/DL (ref 6.1–8.1)
RBC # BLD AUTO: 5.08 MILLION/UL (ref 4.2–5.8)
SODIUM SERPL-SCNC: 137 MMOL/L (ref 135–146)
TRIGL SERPL-MCNC: 88 MG/DL
TSH SERPL-ACNC: 0.95 MIU/L (ref 0.4–4.5)
WBC # BLD AUTO: 6.1 THOUSAND/UL (ref 3.8–10.8)

## 2025-06-17 ENCOUNTER — APPOINTMENT (OUTPATIENT)
Dept: PRIMARY CARE | Facility: CLINIC | Age: 50
End: 2025-06-17
Payer: COMMERCIAL

## 2025-06-17 VITALS
WEIGHT: 199 LBS | DIASTOLIC BLOOD PRESSURE: 72 MMHG | TEMPERATURE: 97.8 F | OXYGEN SATURATION: 96 % | HEART RATE: 73 BPM | SYSTOLIC BLOOD PRESSURE: 110 MMHG | BODY MASS INDEX: 31.23 KG/M2 | HEIGHT: 67 IN | RESPIRATION RATE: 16 BRPM

## 2025-06-17 DIAGNOSIS — R59.0 INGUINAL LYMPHADENOPATHY: Primary | ICD-10-CM

## 2025-06-17 DIAGNOSIS — F32.2 CURRENT SEVERE EPISODE OF MAJOR DEPRESSIVE DISORDER WITHOUT PSYCHOTIC FEATURES WITHOUT PRIOR EPISODE (MULTI): ICD-10-CM

## 2025-06-17 DIAGNOSIS — M79.9 SOFT TISSUE DISORDER: ICD-10-CM

## 2025-06-17 PROCEDURE — 3078F DIAST BP <80 MM HG: CPT | Performed by: FAMILY MEDICINE

## 2025-06-17 PROCEDURE — 3008F BODY MASS INDEX DOCD: CPT | Performed by: FAMILY MEDICINE

## 2025-06-17 PROCEDURE — 1036F TOBACCO NON-USER: CPT | Performed by: FAMILY MEDICINE

## 2025-06-17 PROCEDURE — 3074F SYST BP LT 130 MM HG: CPT | Performed by: FAMILY MEDICINE

## 2025-06-17 PROCEDURE — 99213 OFFICE O/P EST LOW 20 MIN: CPT | Performed by: FAMILY MEDICINE

## 2025-06-17 NOTE — PROGRESS NOTES
"Subjective   Patient ID: Francisco Avila is a 50 y.o. male who presents for Possible Hernia. I last saw the patient on 11/27/2024.     HPI  Patient presents today with complaints of discomfort in the right inguinal area for past couple weeks (2-3 weeks), pt denies any other symptoms. He denies testicular pain.    Patient is a weight  and is possible that he might traumatize the area with weight lifting. However he denies any recent injury or trauma to the area.    Past medical, surgical, and family history reviewed.  Reviewed and documented all medications   Pt eating well, exercising as tolerated and taking medications as directed.      Review of Systems  Except positives as noted in the CC & HPI      Constitutional: Denies fevers, chills, night sweats, fatigue, weight changes, change in appetite    Eyes: Denies blurry vision, double vision    ENT: Denies otalgia, trouble hearing, tinnitus, vertigo, nasal congestion, rhinorrhea, sore throat    Neck: Denies swelling, masses    Cardiovascular: Denies chest pain, palpitations, edema, orthopnea, syncope    Respiratory: Denies dyspnea, cough, wheezing, postural nocturnal dyspnea    Gastrointestinal: Denies abdominal pain, nausea, vomiting, diarrhea, constipation, melena, hematochezia    Genitourinary: Denies dysuria, hematuria  Musculoskeletal: Denies back pain, neck pain, arthralgias, myalgias    Integumentary: Denies skin lesions, rashes, masses    Neurological: Denies dizziness, headaches, confusion, limb weakness, paresthesias, syncope, convulsions    Psychiatric: Denies depression, anxiety, homicidal ideations, suicidal ideations, sleep disturbances    Endocrine: Denies polyphagia, polydipsia, polyuria, weakness, hair thinning, heat intolerance, cold intolerance, weight changes    Heme/Lymph: Denies easy bruising, easy bleeding, swollen glands    Objective   Visit Vitals  /72   Pulse 73   Temp 36.6 °C (97.8 °F)   Resp 16   Ht 1.702 m (5' 7\")   Wt 90.3 kg " (199 lb)   SpO2 96%   BMI 31.17 kg/m²   Smoking Status Never   BSA 2.07 m²       Physical Exam    General Appearance - well-developed, well-nourished, 49 y.o., White male in no acute distress.      Skin - warm, pink and dry without rash or concerning lesions.      Mental Status - alert and oriented x 3. Normal mood and affect appropriate to mood.      Abdomen - soft, obese, protuberant, nontender, nondistended. No masses, hepatomegaly or splenomegaly is noted. No rebound, rigidity or guarding is noted. Bowel sounds are normoactive.     Genitalia - normal circumcised penis. No evidence of penile discharge. Normal testicular exam without tenderness, edema or nodules. No evidence of inguinal hernias. Induration of the subcutaneous tissue noted in the right inguinal area with suspected lymphadenopathy.     Extremities - no cyanosis, clubbing or edema. Pedal pulses are 2+ normal at the dorsalis pedis and posterior pulses bilaterally.      Neurological - cranial nerves II through XII are grossly intact. Motor strength 5/5 at all fours.    Assessment   1. Inguinal lymphadenopathy  US extremity nonvascular real time w image documentation limited anatomic specific      2. Soft tissue disorder  US extremity nonvascular real time w image documentation limited anatomic specific          Patient to continue current medications (with any exceptions as noted) and diet. Follow-up in 6 month(s) otherwise as needed.      Ordered US extremity/right inguinal area nonvascular real time w image documentation limited anatomic specific. Will call patient with results when available.       Patient is to follow up with Dr. Sol ( [facility]) as scheduled tomorrow.     Scribe Attestation  By signing my name below, IKatie , Heber   attest that this documentation has been prepared under the direction and in the presence of Tigre Dunlap MD.      This note has been transcribed using a medical scribe and there is a possibility of  unintentional typing misprints.     All medical record entries made by the scribe were at my direction and personally dictated by me. I have reviewed the chart and agree that the record accurately reflects my personal performance of the history, physical exam, discussion, and plan.     ROYAL BOX M.D.

## 2025-06-18 ENCOUNTER — APPOINTMENT (OUTPATIENT)
Dept: SURGERY | Facility: CLINIC | Age: 50
End: 2025-06-18
Payer: COMMERCIAL

## 2025-06-18 VITALS
WEIGHT: 199 LBS | SYSTOLIC BLOOD PRESSURE: 125 MMHG | DIASTOLIC BLOOD PRESSURE: 75 MMHG | HEIGHT: 67 IN | BODY MASS INDEX: 31.23 KG/M2

## 2025-06-18 DIAGNOSIS — R19.00 ABDOMINAL WALL BULGE: ICD-10-CM

## 2025-06-18 PROCEDURE — 3008F BODY MASS INDEX DOCD: CPT | Performed by: SURGERY

## 2025-06-18 PROCEDURE — 99203 OFFICE O/P NEW LOW 30 MIN: CPT | Performed by: SURGERY

## 2025-06-18 PROCEDURE — 3074F SYST BP LT 130 MM HG: CPT | Performed by: SURGERY

## 2025-06-18 PROCEDURE — 3078F DIAST BP <80 MM HG: CPT | Performed by: SURGERY

## 2025-06-18 NOTE — ASSESSMENT & PLAN NOTE
Current severe episode of major depressive disorder without psychotic features without prior episode is improving.  Patient to continue with current medications and treatment plan.  Follow-up at least annually.

## 2025-06-18 NOTE — PROGRESS NOTES
As it is a working day I wanted to be a little bit closer for everybody Subjective   Patient ID: Francisco Avila is a 50 y.o. male who presents for New Patient Visit (Abdominal bulge).  HPI  50-year-old male otherwise healthy noted right groin bulge and discomfort now improved no obstructive symptoms no previous pain here  Review of Systems   Genitourinary:         Left groin pain   All other systems reviewed and are negative.      Objective   Physical Exam  Physical Exam  Constitutional:       Appearance: Normal appearance.   HENT:      Head: Normocephalic and atraumatic.      Mouth/Throat:      Pharynx: Oropharynx is clear.   Eyes:      Pupils: Pupils are equal, round, and reactive to light.   Cardiovascular:      Rate and Rhythm: Normal rate and regular rhythm.   Pulmonary:      Effort: Pulmonary effort is normal.      Breath sounds: Normal breath sounds.   Abdominal:      General: Abdomen is flat. Bowel sounds are normal.      Palpations: Abdomen is soft.   Musculoskeletal:         General: Normal range of motion.      Cervical back: Normal range of motion.   Skin:     General: Skin is warm.   Neurological:      General: No focal deficit present.      Mental Status: She is alert. Mental status is at baseline.   Psychiatric:         Mood and Affect: Mood normal.   No inguinal hernia was palpated on either side  Assessment/Plan   No hernias palpated in the inguinal area discussed with the patient see back as needed for any recurrent symptoms at which point we will perform CT         Mina Sol MD 06/18/25 3:11 PM

## 2025-06-19 ENCOUNTER — HOSPITAL ENCOUNTER (OUTPATIENT)
Dept: RADIOLOGY | Facility: HOSPITAL | Age: 50
Discharge: HOME | End: 2025-06-19
Payer: COMMERCIAL

## 2025-06-19 DIAGNOSIS — R59.0 INGUINAL LYMPHADENOPATHY: ICD-10-CM

## 2025-06-19 DIAGNOSIS — M79.9 SOFT TISSUE DISORDER: ICD-10-CM

## 2025-06-19 PROCEDURE — 76882 US LMTD JT/FCL EVL NVASC XTR: CPT | Performed by: RADIOLOGY

## 2025-06-19 PROCEDURE — 76882 US LMTD JT/FCL EVL NVASC XTR: CPT
